# Patient Record
Sex: MALE | Race: BLACK OR AFRICAN AMERICAN
[De-identification: names, ages, dates, MRNs, and addresses within clinical notes are randomized per-mention and may not be internally consistent; named-entity substitution may affect disease eponyms.]

---

## 2019-09-01 ENCOUNTER — HOSPITAL ENCOUNTER (EMERGENCY)
Dept: HOSPITAL 76 - ED | Age: 69
Discharge: HOME | End: 2019-09-01
Payer: COMMERCIAL

## 2019-09-01 VITALS — SYSTOLIC BLOOD PRESSURE: 134 MMHG | DIASTOLIC BLOOD PRESSURE: 82 MMHG

## 2019-09-01 DIAGNOSIS — I10: ICD-10-CM

## 2019-09-01 DIAGNOSIS — M77.52: Primary | ICD-10-CM

## 2019-09-01 PROCEDURE — 99284 EMERGENCY DEPT VISIT MOD MDM: CPT

## 2019-09-01 PROCEDURE — 99283 EMERGENCY DEPT VISIT LOW MDM: CPT

## 2019-09-01 PROCEDURE — 73610 X-RAY EXAM OF ANKLE: CPT

## 2019-09-01 NOTE — ED PHYSICIAN DOCUMENTATION
History of Present Illness





- Stated complaint


Stated Complaint: LT ANKLE PX





- Chief complaint


Chief Complaint: Ext Problem





- History obtained from


History obtained from: Patient, Family





- History of Present Illness


Timing: How many days ago (3)


Pain level max: 6


Pain level now: 5





- Additonal information


Additional information: 





Left medial ankle pain and swelling for the past 2 to 3 days.  States started 

after a long walk.  Does not recall any injury.  Has not had prior similar 

symptoms in the past.  No fevers.  No redness.  Better with walking, worse when 

he stops. Does not use any assistive walking devices.





Review of Systems


Constitutional: denies: Fever, Chills


Skin: denies: Rash


Musculoskeletal: denies: Neck pain, Back pain


Neurologic: denies: Headache





PD PAST MEDICAL HISTORY





- Past Medical History


Past Medical History: Yes


Cardiovascular: Hypertension, MI





- Past Surgical History


Past Surgical History: No





- Present Medications


Home Medications: 


                                Ambulatory Orders











 Medication  Instructions  Recorded  Confirmed


 


Meloxicam [Mobic] 15 mg PO DAILY PRN #7 tablet 09/01/19 


 


predniSONE [Prednisone] 40 mg PO DAILY #10 tablet 09/01/19 














- Allergies


Allergies/Adverse Reactions: 


                                    Allergies











Allergy/AdvReac Type Severity Reaction Status Date / Time


 


No Known Drug Allergies Allergy   Verified 09/01/19 12:02














- Living Situation


Living Situation: reports: With family


Living Arrangement: reports: At home





- Social History


Does the pt smoke?: No


Smoking Status: Never smoker





PD ED PE NORMAL





- Vitals


Vital signs reviewed: Yes





- General


General: Alert and oriented X 3, No acute distress





- HEENT


HEENT: Moist mucous membranes





- Derm


Derm: Warm and dry





- Extremities


Extremities: Other (L ankle - Tenderness to palpation and swelling to the medial

 malleolus.  Neurovascularly intact. No erythema.  No warmth.  Full range of 

motion of the joint.)





- Neuro


Neuro: Alert and oriented X 3





Results





- Vitals


Vitals: 


                               Vital Signs - 24 hr











  09/01/19 09/01/19





  11:59 12:56


 


Temperature 36.5 C 36.4 C L


 


Heart Rate 66 64


 


Respiratory 19 12





Rate  


 


Blood Pressure 124/73 134/82 H


 


O2 Saturation 99 100








                                     Oxygen











O2 Source                      Room air

















- Rads (name of study)


  ** Left ankle


Radiology: Prelim report reviewed, EMP read contemporaneously, See rad report 

(No acute abnormality)





PD MEDICAL DECISION MAKING





- ED course


Complexity details: reviewed results, re-evaluated patient, considered 

differential, d/w patient, d/w family


ED course: 





68-year-old male presents to the emergency department what appears to be 

bursitis of the left ankle.  Differential would also include gouty arthritis.  

No evidence of septic arthritis.  Will trial on NSAIDs and steroids.  We will 

follow-up with his doctor for further care.  He will use a cane to help him 

Ambien at home.  Placed in a Aircast for comfort.  Patient counseled regarding 

signs and symptoms for which I believe and urgent re-evaluation would be 

necessary. Patient with good understanding of and agreement to plan and is 

comfortable going home at this time





This document was made in part using voice recognition software. While efforts 

are made to proofread this document, sound alike and grammatical errors may 

occur.





Departure





- Departure


Disposition: 01 Home, Self Care


Clinical Impression: 


 Bursitis of left ankle





Condition: Good


Instructions:  ED Bursitis


Follow-Up: 


your,doctor in 1 week [Other]


Prescriptions: 


Meloxicam [Mobic] 15 mg PO DAILY PRN #7 tablet


 PRN Reason: pain


predniSONE [Prednisone] 40 mg PO DAILY #10 tablet


Comments: 


Use the medications as prescribed. Return if you worsen. As you are on xarelto, 

make sure to monitor your stool for any dark or tarry stools or bright blood in 

your stool. Stop the meloxicam and be evaluated by your doctor or in an 

emergency department. This should improve over the next few days. 


Discharge Date/Time: 09/01/19 13:12

## 2019-09-01 NOTE — XRAY REPORT
Reason:  pain

Procedure Date:  09/01/2019   

Accession Number:  [id number] / [id number]                    

Procedure:  XR  - Ankle 3 View LT CPT Code:  

 

FULL RESULT:

 

 

EXAM:

LEFT ANKLE RADIOGRAPHY

 

EXAM DATE: 9/1/2019 12:17 PM.

 

CLINICAL HISTORY: Left ankle pain.

 

COMPARISON: None.

 

TECHNIQUE: 3 views.

 

FINDINGS:

Bones: Minor calcaneal spurring is seen. No fracture.

 

Joints: Normal. No effusion. No subluxations. The ankle mortise is 

normally aligned.

 

Soft Tissues: Normal. No soft tissue swelling.

IMPRESSION: No acute findings.

 

[facility]